# Patient Record
Sex: MALE | Race: WHITE | Employment: FULL TIME | URBAN - NONMETROPOLITAN AREA
[De-identification: names, ages, dates, MRNs, and addresses within clinical notes are randomized per-mention and may not be internally consistent; named-entity substitution may affect disease eponyms.]

---

## 2019-02-18 ENCOUNTER — APPOINTMENT (OUTPATIENT)
Dept: GENERAL RADIOLOGY | Facility: OTHER | Age: 64
End: 2019-02-18
Attending: FAMILY MEDICINE
Payer: COMMERCIAL

## 2019-02-18 ENCOUNTER — HOSPITAL ENCOUNTER (EMERGENCY)
Facility: OTHER | Age: 64
Discharge: HOME | End: 2019-02-18
Attending: FAMILY MEDICINE | Admitting: FAMILY MEDICINE
Payer: COMMERCIAL

## 2019-02-18 VITALS
HEART RATE: 69 BPM | TEMPERATURE: 97.6 F | HEIGHT: 64 IN | WEIGHT: 165 LBS | SYSTOLIC BLOOD PRESSURE: 143 MMHG | OXYGEN SATURATION: 97 % | BODY MASS INDEX: 28.17 KG/M2 | DIASTOLIC BLOOD PRESSURE: 90 MMHG | RESPIRATION RATE: 16 BRPM

## 2019-02-18 DIAGNOSIS — S32.010A COMPRESSION FRACTURE OF L1 LUMBAR VERTEBRA, CLOSED, INITIAL ENCOUNTER (H): ICD-10-CM

## 2019-02-18 PROCEDURE — 99284 EMERGENCY DEPT VISIT MOD MDM: CPT | Mod: 25 | Performed by: FAMILY MEDICINE

## 2019-02-18 PROCEDURE — 99284 EMERGENCY DEPT VISIT MOD MDM: CPT | Performed by: FAMILY MEDICINE

## 2019-02-18 PROCEDURE — 72100 X-RAY EXAM L-S SPINE 2/3 VWS: CPT

## 2019-02-18 PROCEDURE — 99283 EMERGENCY DEPT VISIT LOW MDM: CPT | Mod: Z6 | Performed by: FAMILY MEDICINE

## 2019-02-18 PROCEDURE — 71101 X-RAY EXAM UNILAT RIBS/CHEST: CPT | Mod: LT

## 2019-02-18 RX ORDER — OXYCODONE AND ACETAMINOPHEN 5; 325 MG/1; MG/1
1 TABLET ORAL EVERY 6 HOURS PRN
Qty: 16 TABLET | Refills: 0 | Status: SHIPPED | OUTPATIENT
Start: 2019-02-18

## 2019-02-18 SDOH — HEALTH STABILITY: MENTAL HEALTH: HOW OFTEN DO YOU HAVE A DRINK CONTAINING ALCOHOL?: NEVER

## 2019-02-18 ASSESSMENT — MIFFLIN-ST. JEOR: SCORE: 1464.44

## 2019-02-18 NOTE — ED PROVIDER NOTES
"  History     Chief Complaint   Patient presents with     Rib Pain     Back Pain     HPI  Chip Rowan is a 63 year old male who fell off a roof about a week ago.  He landed on both feet and then went to his buttocks and felt a jar into his spine.  He states that he did not have any loss of consciousness he is noticed some numbness and tingling at times into his legs but has not had any bowel or bladder issues.  He comes in today because he is having persistent and more severe back pain.  He did have pain medications at home that he had from his wisdom tooth surgery but he is run out of those and as result has come in forevaluation.    Allergies:  No Known Allergies    Problem List:    There are no active problems to display for this patient.       Past Medical History:    History reviewed. No pertinent past medical history.    Past Surgical History:    History reviewed. No pertinent surgical history.    Family History:    Family History   Problem Relation Age of Onset     Family History Negative Mother         Good Health     Family History Negative Father         Good Health     Cancer Sister         Cancer,cancer in the leg-groin region.       Social History:  Marital Status:   [2]  Social History     Tobacco Use     Smoking status: Current Every Day Smoker     Packs/day: 0.25     Smokeless tobacco: Never Used   Substance Use Topics     Alcohol use: No     Frequency: Never     Drug use: No        Medications:      oxyCODONE-acetaminophen (PERCOCET) 5-325 MG tablet         Review of Systems   All other systems reviewed and are negative.      Physical Exam   BP: 143/90  Pulse: 69  Temp: 97.6  F (36.4  C)  Resp: 16  Height: 162.6 cm (5' 4\")  Weight: 74.8 kg (165 lb)  SpO2: 97 %      Physical Exam   Constitutional: He is oriented to person, place, and time. He appears well-developed and well-nourished. No distress.   HENT:   Head: Normocephalic and atraumatic.   Eyes: EOM are normal. Pupils are equal, round, " and reactive to light.   Pulmonary/Chest: Effort normal and breath sounds normal.   Abdominal: Soft. Bowel sounds are normal.   Musculoskeletal:   Tenderness at the spine at L1.   Neurological: He is alert and oriented to person, place, and time. No cranial nerve deficit. Coordination normal.   Nursing note and vitals reviewed.      ED Course     ED Course as of Feb 18 1758   Mon Feb 18, 2019   1448 XR Lumbar Spine 2/3 Views     Procedures         Results for orders placed or performed during the hospital encounter of 02/18/19 (from the past 24 hour(s))   XR Ribs & Chest Left G/E 3 Views    Narrative    PROCEDURE: XR RIBS & CHEST LT 3VW 2/18/2019 1:40 PM    HISTORY: fall 4 days ago    COMPARISONS: None.    TECHNIQUE: PA view of the chest and 3 views of left ribs.    FINDINGS: Heart and pulmonary vasculature are normal. There is linear  atelectasis at the right lung base. Right lung is clear.    No rib fracture is seen. There is no pneumothorax.         Impression    IMPRESSION: No acute fracture.    LAWRENCE GARCIA MD   XR Lumbar Spine 2/3 Views    Narrative    PROCEDURE: XR LUMBAR SPINE 2-3 VIEWS 2/18/2019 1:40 PM    HISTORY: fall 4 days ago    COMPARISONS: None.    TECHNIQUE: 3 views.    FINDINGS: There is an anterior wedge compression fracture at the L1  level with almost 50% loss of height anteriorly. Age is indeterminate  without prior imaging.    There is a transitional lumbosacral segment with some sacralization of  the L5 level. There is grade 1-2 anterolisthesis of L4 on L5. Vascular  calcification is seen.         Impression    IMPRESSION: Compression fracture at the L1 level.    LAWRENCE GARCIA MD       Medications - No data to display    Assessments & Plan (with Medical Decision Making)     I have reviewed the nursing notes.    I have reviewed the findings, diagnosis, plan and need for follow up with the patient.  Test results with patient.  We will send him home with pain medications and advised  that [facility] to see whether or not they think he is a candidate for a TLSO.  He will likely need additional pain meds and should set himself up with a primary care physician as well.  He was comfortable that plan recommended ice to the affected area 15 minutes every 2-3 hours also to help with pain.  He will follow-up here if he has increased numbness tingling or any difficulty with bowel or bladder.       Medication List      Started    oxyCODONE-acetaminophen 5-325 MG tablet  Commonly known as:  PERCOCET  1 tablet, Oral, EVERY 6 HOURS PRN            Final diagnoses:   Compression fracture of L1 lumbar vertebra, closed, initial encounter (H)       2/18/2019   [facility]     Darron Rios MD  02/18/19 [id number]

## 2019-02-18 NOTE — ED AVS SNAPSHOT
[facility]  [address]  [place] [address]  Phone:  [phone]  Fax:  [phone]                                    Chip Rowan   MRN: [id number]    Department:  [facility]   Date of Visit:  2/18/2019           After Visit Summary Signature Page    I have received my discharge instructions, and my questions have been answered. I have discussed any challenges I see with this plan with the nurse or doctor.    ..........................................................................................................................................  Patient/Patient Representative Signature      ..........................................................................................................................................  Patient Representative Print Name and Relationship to Patient    ..................................................               ................................................  Date                                   Time    ..........................................................................................................................................  Reviewed by Signature/Title    ...................................................              ..............................................  Date                                               Time          22EPIC Rev 08/18

## 2019-02-18 NOTE — ED TRIAGE NOTES
Patient states he fell off a 12ft roof 4 days ago and landed on his buttocks and whiplashed backwards and hit back of neck and head. Is c/o L lower rib pain that wraps around to his back and lower back pain. Patient has not been in to see a doctor since fall. Has been using warm baths, ice and had old pain medication at home along with tylenol. Patient took pain medication this morning. Patient has increased pain in ribs with deep breathing, coughing and sneezing. Melissa De La Rosa RN on 2/18/2019 at 1:14 PM

## 2024-01-09 NOTE — ED NOTES
Patient sitting in chair in waiting room. Is uncomfortable but denies needing anything for pain at this time. Melissa De La Rosa RN on 2/18/2019 at 2:09 PM    
pulse oximetry